# Patient Record
Sex: MALE | Race: WHITE | Employment: FULL TIME | URBAN - METROPOLITAN AREA
[De-identification: names, ages, dates, MRNs, and addresses within clinical notes are randomized per-mention and may not be internally consistent; named-entity substitution may affect disease eponyms.]

---

## 2024-02-08 ENCOUNTER — OFFICE VISIT (OUTPATIENT)
Dept: FAMILY MEDICINE CLINIC | Facility: CLINIC | Age: 59
End: 2024-02-08
Payer: COMMERCIAL

## 2024-02-08 VITALS
HEIGHT: 63 IN | BODY MASS INDEX: 26.93 KG/M2 | DIASTOLIC BLOOD PRESSURE: 90 MMHG | WEIGHT: 152 LBS | TEMPERATURE: 98 F | RESPIRATION RATE: 16 BRPM | SYSTOLIC BLOOD PRESSURE: 160 MMHG | HEART RATE: 69 BPM | OXYGEN SATURATION: 99 %

## 2024-02-08 DIAGNOSIS — I10 PRIMARY HYPERTENSION: ICD-10-CM

## 2024-02-08 DIAGNOSIS — Z11.59 NEED FOR HEPATITIS C SCREENING TEST: ICD-10-CM

## 2024-02-08 DIAGNOSIS — N40.0 BENIGN PROSTATIC HYPERPLASIA WITHOUT LOWER URINARY TRACT SYMPTOMS: ICD-10-CM

## 2024-02-08 DIAGNOSIS — K29.50 OTHER CHRONIC GASTRITIS WITHOUT HEMORRHAGE: ICD-10-CM

## 2024-02-08 DIAGNOSIS — K76.0 FATTY LIVER: ICD-10-CM

## 2024-02-08 DIAGNOSIS — Z00.00 ANNUAL PHYSICAL EXAM: Primary | ICD-10-CM

## 2024-02-08 DIAGNOSIS — K80.20 GALLSTONES: ICD-10-CM

## 2024-02-08 PROCEDURE — 99396 PREV VISIT EST AGE 40-64: CPT | Performed by: FAMILY MEDICINE

## 2024-02-08 PROCEDURE — 99204 OFFICE O/P NEW MOD 45 MIN: CPT | Performed by: FAMILY MEDICINE

## 2024-02-08 RX ORDER — VALSARTAN 80 MG/1
80 TABLET ORAL DAILY
Qty: 30 TABLET | Refills: 6 | Status: SHIPPED | OUTPATIENT
Start: 2024-02-08

## 2024-02-08 NOTE — PROGRESS NOTES
Name: Shakir Montiel      : 1965      MRN: [id number]  Encounter Provider: Neena Aly MD  Encounter Date: 2024   Encounter department: [facility] MEDICINE    Assessment & Plan     1. Annual physical exam  Assessment & Plan:  Check routine labs      Orders:  -     CBC and differential; Future  -     Comprehensive metabolic panel; Future; Expected date: 2024  -     Lipid panel; Future; Expected date: 2024  -     TSH, 3rd generation; Future  -     Urinalysis with microscopic  -     PSA, Total Screen; Future    2. Need for hepatitis C screening test  -     Hepatitis C Antibody; Future    3. Fatty liver  Assessment & Plan:  Discussion on diet and exercise guidelines for weight loss and  health reviewed with pt         4. Other chronic gastritis without hemorrhage  Assessment & Plan:  Had endoscopy  in [place] had H pylori needs  was treated       5. Gallstones  Assessment & Plan:  Asymptomatic       6. Primary hypertension  Assessment & Plan:  Start valsartan 80mg po qd and check cmp 1 week and visit 1 week     Orders:  -     valsartan (DIOVAN) 80 mg tablet; Take 1 tablet (80 mg total) by mouth daily    7. Benign prostatic hyperplasia without lower urinary tract symptoms  Assessment & Plan:  Check psa             Subjective      New patient here to be established due for annual physical      Review of Systems   Constitutional:  Negative for appetite change, chills, fatigue and fever.   Respiratory:  Negative for cough, chest tightness and shortness of breath.    Cardiovascular:  Negative for chest pain, palpitations and leg swelling.   Gastrointestinal:  Negative for abdominal pain, constipation, diarrhea, nausea and vomiting.   Genitourinary:  Negative for difficulty urinating and frequency.   Musculoskeletal:  Negative for arthralgias, back pain, gait problem and neck pain.   Skin:  Negative for rash.   Neurological:  Negative for dizziness, weakness,  "light-headedness, numbness and headaches.   Hematological:  Does not bruise/bleed easily.   Psychiatric/Behavioral:  Negative for dysphoric mood and sleep disturbance. The patient is not nervous/anxious.        No current outpatient medications on file prior to visit.       Objective     /90 (BP Location: Right arm)   Pulse 69   Temp 98 °F (36.7 °C) (Tympanic)   Resp 16   Ht 5' 3\" (1.6 m)   Wt 68.9 kg (152 lb)   SpO2 99%   BMI 26.93 kg/m²     Physical Exam  Vitals reviewed.   Constitutional:       General: He is not in acute distress.     Appearance: Normal appearance. He is not ill-appearing.   HENT:      Right Ear: Tympanic membrane, ear canal and external ear normal.      Left Ear: Tympanic membrane, ear canal and external ear normal.      Nose: Nose normal.   Eyes:      General: Lids are normal.      Extraocular Movements: Extraocular movements intact.      Conjunctiva/sclera: Conjunctivae normal.      Pupils: Pupils are equal, round, and reactive to light.   Neck:      Thyroid: No thyromegaly.      Vascular: No carotid bruit.   Cardiovascular:      Rate and Rhythm: Normal rate and regular rhythm.      Pulses: Normal pulses.      Heart sounds: Normal heart sounds.   Pulmonary:      Effort: Pulmonary effort is normal.      Breath sounds: Normal breath sounds.   Chest:   Breasts:     Right: Normal.      Left: Normal.   Abdominal:      General: Bowel sounds are normal. There is no distension.      Palpations: Abdomen is soft. There is no mass.      Tenderness: There is no abdominal tenderness.      Hernia: No hernia is present.   Musculoskeletal:         General: Normal range of motion.      Cervical back: Full passive range of motion without pain, normal range of motion and neck supple.      Right lower leg: No edema.      Left lower leg: No edema.   Lymphadenopathy:      Cervical: No cervical adenopathy.   Skin:     General: Skin is warm and dry.      Comments: No abn moles   Neurological:      " General: No focal deficit present.      Mental Status: He is alert and oriented to person, place, and time. Mental status is at baseline.      Cranial Nerves: No cranial nerve deficit.      Sensory: No sensory deficit.      Motor: No weakness or tremor.      Coordination: Coordination normal.      Gait: Gait normal.      Deep Tendon Reflexes: Reflexes normal.   Psychiatric:         Mood and Affect: Mood normal.         Speech: Speech normal.         Behavior: Behavior normal.       Neena Aly MD  ]

## 2024-02-15 ENCOUNTER — LAB (OUTPATIENT)
Dept: LAB | Facility: HOSPITAL | Age: 59
End: 2024-02-15
Payer: COMMERCIAL

## 2024-02-15 DIAGNOSIS — Z11.59 NEED FOR HEPATITIS C SCREENING TEST: ICD-10-CM

## 2024-02-15 DIAGNOSIS — Z00.00 ANNUAL PHYSICAL EXAM: ICD-10-CM

## 2024-02-15 LAB
ALBUMIN SERPL BCP-MCNC: 4.5 G/DL (ref 3.5–5)
ALP SERPL-CCNC: 76 U/L (ref 34–104)
ALT SERPL W P-5'-P-CCNC: 20 U/L (ref 7–52)
ANION GAP SERPL CALCULATED.3IONS-SCNC: 6 MMOL/L
AST SERPL W P-5'-P-CCNC: 23 U/L (ref 13–39)
BACTERIA UR QL AUTO: ABNORMAL /HPF
BASOPHILS # BLD AUTO: 0.01 THOUSANDS/ÂΜL (ref 0–0.1)
BASOPHILS NFR BLD AUTO: 0 % (ref 0–1)
BILIRUB SERPL-MCNC: 0.9 MG/DL (ref 0.2–1)
BILIRUB UR QL STRIP: NEGATIVE
BUN SERPL-MCNC: 14 MG/DL (ref 5–25)
CALCIUM SERPL-MCNC: 9.5 MG/DL (ref 8.4–10.2)
CHLORIDE SERPL-SCNC: 101 MMOL/L (ref 96–108)
CHOLEST SERPL-MCNC: 207 MG/DL
CLARITY UR: CLEAR
CO2 SERPL-SCNC: 29 MMOL/L (ref 21–32)
COLOR UR: YELLOW
CREAT SERPL-MCNC: 0.91 MG/DL (ref 0.6–1.3)
EOSINOPHIL # BLD AUTO: 0.1 THOUSAND/ÂΜL (ref 0–0.61)
EOSINOPHIL NFR BLD AUTO: 2 % (ref 0–6)
ERYTHROCYTE [DISTWIDTH] IN BLOOD BY AUTOMATED COUNT: 12.8 % (ref 11.6–15.1)
GFR SERPL CREATININE-BSD FRML MDRD: 91 ML/MIN/1.73SQ M
GLUCOSE P FAST SERPL-MCNC: 100 MG/DL (ref 65–99)
GLUCOSE UR STRIP-MCNC: NEGATIVE MG/DL
HCT VFR BLD AUTO: 50.5 % (ref 36.5–49.3)
HCV AB SER QL: NORMAL
HDLC SERPL-MCNC: 60 MG/DL
HGB BLD-MCNC: 17 G/DL (ref 12–17)
HGB UR QL STRIP.AUTO: ABNORMAL
IMM GRANULOCYTES # BLD AUTO: 0.01 THOUSAND/UL (ref 0–0.2)
IMM GRANULOCYTES NFR BLD AUTO: 0 % (ref 0–2)
KETONES UR STRIP-MCNC: NEGATIVE MG/DL
LDLC SERPL CALC-MCNC: 131 MG/DL (ref 0–100)
LEUKOCYTE ESTERASE UR QL STRIP: NEGATIVE
LYMPHOCYTES # BLD AUTO: 2.25 THOUSANDS/ÂΜL (ref 0.6–4.47)
LYMPHOCYTES NFR BLD AUTO: 36 % (ref 14–44)
MCH RBC QN AUTO: 30.9 PG (ref 26.8–34.3)
MCHC RBC AUTO-ENTMCNC: 33.7 G/DL (ref 31.4–37.4)
MCV RBC AUTO: 92 FL (ref 82–98)
MONOCYTES # BLD AUTO: 0.52 THOUSAND/ÂΜL (ref 0.17–1.22)
MONOCYTES NFR BLD AUTO: 8 % (ref 4–12)
NEUTROPHILS # BLD AUTO: 3.32 THOUSANDS/ÂΜL (ref 1.85–7.62)
NEUTS SEG NFR BLD AUTO: 54 % (ref 43–75)
NITRITE UR QL STRIP: NEGATIVE
NON-SQ EPI CELLS URNS QL MICRO: ABNORMAL /HPF
NONHDLC SERPL-MCNC: 147 MG/DL
NRBC BLD AUTO-RTO: 0 /100 WBCS
PH UR STRIP.AUTO: 6 [PH]
PLATELET # BLD AUTO: 271 THOUSANDS/UL (ref 149–390)
PMV BLD AUTO: 10.5 FL (ref 8.9–12.7)
POTASSIUM SERPL-SCNC: 3.8 MMOL/L (ref 3.5–5.3)
PROT SERPL-MCNC: 7.9 G/DL (ref 6.4–8.4)
PROT UR STRIP-MCNC: NEGATIVE MG/DL
PSA SERPL-MCNC: 1.03 NG/ML (ref 0–4)
RBC # BLD AUTO: 5.51 MILLION/UL (ref 3.88–5.62)
RBC #/AREA URNS AUTO: ABNORMAL /HPF
SODIUM SERPL-SCNC: 136 MMOL/L (ref 135–147)
SP GR UR STRIP.AUTO: 1.01 (ref 1–1.03)
TRIGL SERPL-MCNC: 82 MG/DL
TSH SERPL DL<=0.05 MIU/L-ACNC: 3.07 UIU/ML (ref 0.45–4.5)
UROBILINOGEN UR QL STRIP.AUTO: 0.2 E.U./DL
WBC # BLD AUTO: 6.21 THOUSAND/UL (ref 4.31–10.16)
WBC #/AREA URNS AUTO: ABNORMAL /HPF

## 2024-02-15 PROCEDURE — G0103 PSA SCREENING: HCPCS

## 2024-02-15 PROCEDURE — 84443 ASSAY THYROID STIM HORMONE: CPT

## 2024-02-15 PROCEDURE — 80053 COMPREHEN METABOLIC PANEL: CPT

## 2024-02-15 PROCEDURE — 86803 HEPATITIS C AB TEST: CPT

## 2024-02-15 PROCEDURE — 81001 URINALYSIS AUTO W/SCOPE: CPT | Performed by: FAMILY MEDICINE

## 2024-02-15 PROCEDURE — 36415 COLL VENOUS BLD VENIPUNCTURE: CPT

## 2024-02-15 PROCEDURE — 85025 COMPLETE CBC W/AUTO DIFF WBC: CPT

## 2024-02-15 PROCEDURE — 80061 LIPID PANEL: CPT

## 2024-02-16 ENCOUNTER — OFFICE VISIT (OUTPATIENT)
Dept: FAMILY MEDICINE CLINIC | Facility: CLINIC | Age: 59
End: 2024-02-16
Payer: COMMERCIAL

## 2024-02-16 VITALS
SYSTOLIC BLOOD PRESSURE: 122 MMHG | DIASTOLIC BLOOD PRESSURE: 78 MMHG | RESPIRATION RATE: 16 BRPM | OXYGEN SATURATION: 99 % | HEART RATE: 71 BPM | TEMPERATURE: 98 F | BODY MASS INDEX: 27.46 KG/M2 | HEIGHT: 63 IN | WEIGHT: 155 LBS

## 2024-02-16 DIAGNOSIS — I10 PRIMARY HYPERTENSION: Primary | ICD-10-CM

## 2024-02-16 DIAGNOSIS — M65.331 TRIGGER MIDDLE FINGER OF RIGHT HAND: ICD-10-CM

## 2024-02-16 DIAGNOSIS — K29.50 OTHER CHRONIC GASTRITIS WITHOUT HEMORRHAGE: ICD-10-CM

## 2024-02-16 DIAGNOSIS — R73.09 ABNORMAL GLUCOSE LEVEL: ICD-10-CM

## 2024-02-16 DIAGNOSIS — E78.5 DYSLIPIDEMIA: ICD-10-CM

## 2024-02-16 PROBLEM — R73.03 PREDIABETES: Status: ACTIVE | Noted: 2024-02-16

## 2024-02-16 LAB — SL AMB POCT HEMOGLOBIN AIC: 5.7 (ref ?–6.5)

## 2024-02-16 PROCEDURE — 83036 HEMOGLOBIN GLYCOSYLATED A1C: CPT | Performed by: FAMILY MEDICINE

## 2024-02-16 PROCEDURE — 99214 OFFICE O/P EST MOD 30 MIN: CPT | Performed by: FAMILY MEDICINE

## 2024-02-16 NOTE — ASSESSMENT & PLAN NOTE
Check lipids 6 mo Discussion on diet and exercise guidelines for weight loss and  health reviewed with pt

## 2024-02-16 NOTE — PROGRESS NOTES
"Name: Shakir Montiel      : 1965      MRN: [id number]  Encounter Provider: Neena Aly MD  Encounter Date: 2024   Encounter department: [facility] MEDICINE    Assessment & Plan     1. Primary hypertension  Assessment & Plan:  Much better on valsartan  follow  up 6 mo      2. Abnormal glucose level  Assessment & Plan:  Check HGa1c    Orders:  -     POCT hemoglobin A1c    3. Dyslipidemia  Assessment & Plan:  Check lipids 6 mo Discussion on diet and exercise guidelines for weight loss and  health reviewed with pt       Orders:  -     Lipid panel; Future; Expected date: 2024    4. Other chronic gastritis without hemorrhage  Assessment & Plan:  To  GI   for  eval     Orders:  -     Ambulatory Referral to Gastroenterology; Future    5. Trigger middle finger of right hand  Assessment & Plan:  To hand surgeon    Orders:  -     Ambulatory Referral to Orthopedic Surgery; Future           Subjective      Here for reevaluation of blood pressure on valsartan  and check Hga1c for elevated glucose  also elevated lipids on labs       Review of Systems   Respiratory:  Negative for cough, chest tightness and shortness of breath.    Cardiovascular:  Negative for chest pain, palpitations and leg swelling.   Gastrointestinal:         Reflux symptoms   Musculoskeletal:         Trigger finger right 3rd    Neurological:  Negative for dizziness, weakness, light-headedness and headaches.   Psychiatric/Behavioral:  Negative for dysphoric mood. The patient is not nervous/anxious.        Current Outpatient Medications on File Prior to Visit   Medication Sig   • valsartan (DIOVAN) 80 mg tablet Take 1 tablet (80 mg total) by mouth daily       Objective     /78 (BP Location: Left arm, Patient Position: Sitting, Cuff Size: Standard)   Pulse 71   Temp 98 °F (36.7 °C) (Tympanic)   Resp 16   Ht 5' 3\" (1.6 m)   Wt 70.3 kg (155 lb)   SpO2 99%   BMI 27.46 kg/m²     Physical " Exam  Constitutional:       General: He is not in acute distress.     Appearance: Normal appearance. He is well-developed. He is not ill-appearing.   Eyes:      Extraocular Movements: Extraocular movements intact.      Pupils: Pupils are equal, round, and reactive to light.   Neck:      Thyroid: No thyromegaly.   Cardiovascular:      Rate and Rhythm: Normal rate and regular rhythm.      Pulses: Normal pulses.      Heart sounds: Normal heart sounds. No murmur heard.  Pulmonary:      Effort: Pulmonary effort is normal. No respiratory distress.      Breath sounds: Normal breath sounds.   Musculoskeletal:      Cervical back: Normal range of motion.      Comments: 3rd  finger right    Neurological:      General: No focal deficit present.      Mental Status: He is alert and oriented to person, place, and time. Mental status is at baseline.   Psychiatric:         Mood and Affect: Mood normal.         Behavior: Behavior normal.       Neena Aly MD

## 2024-02-16 NOTE — ASSESSMENT & PLAN NOTE
Hga1c 5.7 Discussion on diet and exercise guidelines for weight loss and  health reviewed with pt

## 2024-02-22 ENCOUNTER — TELEPHONE (OUTPATIENT)
Dept: OTHER | Facility: OTHER | Age: 59
End: 2024-02-22

## 2024-03-07 ENCOUNTER — OFFICE VISIT (OUTPATIENT)
Dept: OBGYN CLINIC | Facility: CLINIC | Age: 59
End: 2024-03-07

## 2024-03-07 ENCOUNTER — HOSPITAL ENCOUNTER (OUTPATIENT)
Dept: RADIOLOGY | Facility: HOSPITAL | Age: 59
End: 2024-03-07
Attending: STUDENT IN AN ORGANIZED HEALTH CARE EDUCATION/TRAINING PROGRAM

## 2024-03-07 VITALS — WEIGHT: 155 LBS | HEIGHT: 63 IN | BODY MASS INDEX: 27.46 KG/M2

## 2024-03-07 DIAGNOSIS — M65.331 TRIGGER MIDDLE FINGER OF RIGHT HAND: ICD-10-CM

## 2024-03-07 DIAGNOSIS — L60.9 NAIL LESION: ICD-10-CM

## 2024-03-07 DIAGNOSIS — L60.9 NAIL LESION: Primary | ICD-10-CM

## 2024-03-07 PROCEDURE — 20550 NJX 1 TENDON SHEATH/LIGAMENT: CPT | Performed by: STUDENT IN AN ORGANIZED HEALTH CARE EDUCATION/TRAINING PROGRAM

## 2024-03-07 PROCEDURE — 73140 X-RAY EXAM OF FINGER(S): CPT

## 2024-03-07 PROCEDURE — 99244 OFF/OP CNSLTJ NEW/EST MOD 40: CPT | Performed by: STUDENT IN AN ORGANIZED HEALTH CARE EDUCATION/TRAINING PROGRAM

## 2024-03-07 RX ORDER — BETAMETHASONE SODIUM PHOSPHATE AND BETAMETHASONE ACETATE 3; 3 MG/ML; MG/ML
6 INJECTION, SUSPENSION INTRA-ARTICULAR; INTRALESIONAL; INTRAMUSCULAR; SOFT TISSUE
Status: COMPLETED | OUTPATIENT
Start: 2024-03-07 | End: 2024-03-07

## 2024-03-07 RX ORDER — CHLORHEXIDINE GLUCONATE ORAL RINSE 1.2 MG/ML
15 SOLUTION DENTAL ONCE
OUTPATIENT
Start: 2024-03-07 | End: 2024-03-07

## 2024-03-07 RX ORDER — LIDOCAINE HYDROCHLORIDE 5 MG/ML
1 INJECTION, SOLUTION INFILTRATION; PERINEURAL
Status: COMPLETED | OUTPATIENT
Start: 2024-03-07 | End: 2024-03-07

## 2024-03-07 RX ADMIN — LIDOCAINE HYDROCHLORIDE 1 ML: 5 INJECTION, SOLUTION INFILTRATION; PERINEURAL at 09:00

## 2024-03-07 RX ADMIN — BETAMETHASONE SODIUM PHOSPHATE AND BETAMETHASONE ACETATE 6 MG: 3; 3 INJECTION, SUSPENSION INTRA-ARTICULAR; INTRALESIONAL; INTRAMUSCULAR; SOFT TISSUE at 09:00

## 2024-03-07 NOTE — LETTER
March 8, 2024     Neena Aly MD  [address]  [address]  [place] [address]    Patient: Shakir Montiel   YOB: 1965   Date of Visit: 3/7/2024       Dear Dr. Aly:    Thank you for referring Shakir Montiel to me for evaluation. Below are my notes for this consultation.    If you have questions, please do not hesitate to call me. I look forward to following your patient along with you.         Sincerely,        Ulices Crespo MD        CC: No Recipients

## 2024-03-07 NOTE — PROGRESS NOTES
ORTHOPAEDIC HAND, WRIST, AND ELBOW OFFICE  VISIT      ASSESSMENT/PLAN:      Diagnoses and all orders for this visit:    Nail lesion  -     XR finger left second digit-index; Future  -     Case request operating room: EXCISION FINGER MASS - LEFT INDEX FINGER  POSSIBLE SOFT TISSUE COVERAGE; Standing  -     Ambulatory referral to Family Practice; Future  -     Case request operating room: EXCISION FINGER MASS - LEFT INDEX FINGER  POSSIBLE SOFT TISSUE COVERAGE    Trigger middle finger of right hand  -     Ambulatory Referral to Orthopedic Surgery  -     Hand/upper extremity injection: R long A1  -     lidocaine (XYLOCAINE) 0.5 % injection 1 mL  -     betamethasone acetate-betamethasone sodium phosphate (CELESTONE) injection 6 mg    Other orders  -     Diet NPO; Sips with meds; Standing  -     Nursing Communication Warmimg Interventions Implemented; Standing  -     Nursing Communication CHG bath, have staff wash entire body (neck down) per pre-op bathing protocol. Routine, evening prior to, and day of surgery.; Standing  -     Nursing Communication Swab both nares with Povidone-Iodine solution, EXCLUDE if patient has shellfish/Iodine allergy, and replace with nasal alcohol swabstick. Routine, day of surgery, on call to OR; Standing  -     chlorhexidine (PERIDEX) 0.12 % oral rinse 15 mL  -     Void on call to OR; Standing  -     Insert peripheral IV; Standing  -     ceFAZolin (ANCEF) 2,000 mg in dextrose 5 % 100 mL IVPB          58 y.o. male with right long TF and left index finger radial aspect nail lesion that was previous excised in 2/2023    Discussed with the patient that his signs and symptoms are consistent with right long TF. Treatment options for the trigger finger was discussed in the form of a steroid injection. The patient was agreeable to this. He consented and underwent a right long finger trigger finger injection in the office today without any complications. The patient also has a nail lesion radial aspect  of his left index finger. He did have this excised in [place] in February of 2023. The pathology was reviewed in the office today which was consistent with fibroma. A clinical images was taken and uploaded into the patient's chart. A photo of the pathology report was also taken and uploaded into the patient's chart under the media tab. Treatment options were discussed. The patient elected to proceed with left index finger excision of lesion with possible soft tissue coverage. Discussed risk of recurrence as this did come back once. Risks of the surgery are inclusive of but not limited to bleeding, infection, nerve injury, blood clot, worsening of symptoms, not achieving the anticipated results, persistent stiffness, weakness and the need for additional surgery. The patient verbally stated they understood those risks and would like to proceed with the surgery. Surgical consent was signed in the office today. I will see him the day of surgery.        Follow Up:  After surgery       To Do Next Visit:  Sutures out      Discussions:  Trigger Finger: The anatomy and physiology of trigger finger was discussed with the patient today in the office.  Edema and increased contact pressure within the flexor tendons at the A1 pulley can cause pain, crepitation, and triggering or locking of the digit resulting in limitation of function.  Symptoms can occur at anytime but are typically worse in the morning or after a brief rest from repetitive activity.  Treatment options include resting/nighttime MP blocking splints to decrease edema, oral anti-inflammatory medications, home or formal therapy exercises, up to 2 steroid injections within the tendon sheath, or surgical release.  While majority of patients do respond to conservative treatment, up to 20% may require surgical release.       Ulices Crespo MD  Attending, Orthopaedic Surgery  Hand, Wrist, and Elbow Surgery  [facility]    ______________________________________________________________________________________________    CHIEF COMPLAINT:  Chief Complaint   Patient presents with   • Right Middle Finger - Pain       SUBJECTIVE:  Patient is a 58 y.o. ambidextrous male who presents today for evaluation and treatment of right long finger pain. The patient notes a history of a puncture wound to right palm.  On his left index finger radial aspect he notes a nail lesion that was previous excised in [place] in February of 2023. He states he has this for 12 years and it has came back after the excision. The  previous pathology was consistent with fibroma. The patient is referred by Neena Aly MD. Feb 2023     Occupation: puts away projects      I have personally reviewed all the relevant PMH, PSH, SH, FH, Medications and allergies      PAST MEDICAL HISTORY:  Past Medical History:   Diagnosis Date   • Fatty liver    • Gastritis        PAST SURGICAL HISTORY:  Past Surgical History:   Procedure Laterality Date   • APPENDECTOMY         FAMILY HISTORY:  Family History   Problem Relation Age of Onset   • Pulmonary fibrosis Mother    • Pancreatic cancer Father        SOCIAL HISTORY:  Social History     Tobacco Use   • Smoking status: Never     Passive exposure: Never   • Smokeless tobacco: Never   Vaping Use   • Vaping status: Never Used   Substance Use Topics   • Alcohol use: Yes     Comment: socially       MEDICATIONS:    Current Outpatient Medications:   •  valsartan (DIOVAN) 80 mg tablet, Take 1 tablet (80 mg total) by mouth daily, Disp: 30 tablet, Rfl: 6  No current facility-administered medications for this visit.    ALLERGIES:  No Known Allergies        REVIEW OF SYSTEMS:  Musculoskeletal:        As noted in HPI.   All other systems reviewed and are negative.    VITALS:  There were no vitals filed for this visit.    LABS:  HgA1c:   Lab Results   Component Value Date    HGBA1C 5.7 02/16/2024     BMP:   Lab Results   Component Value  "Date    CALCIUM 9.5 02/15/2024    K 3.8 02/15/2024    CO2 29 02/15/2024     02/15/2024    BUN 14 02/15/2024    CREATININE 0.91 02/15/2024       _____________________________________________________  PHYSICAL EXAMINATION:  General: Well developed and well nourished, alert & oriented x 3, appears comfortable  Psychiatric: Normal  HEENT: Normocephalic, Atraumatic Trachea Midline, No torticollis  Pulmonary: No audible wheezing or respiratory distress   Abdomen/GI: Non tender, non distended   Cardiovascular: No pitting edema, 2+ radial pulse   Skin: No masses, erythema, lacerations, fluctation, ulcerations  Neurovascular: Sensation Intact to the Median, Ulnar, Radial Nerve, Motor Intact to the Median, Ulnar, Radial Nerve, and Pulses Intact  Musculoskeletal: Normal, except as noted in detailed exam and in HPI.      MUSCULOSKELETAL EXAMINATION:  Right long finger   + click  TTP A1 pulley     Left index finger  Radial aspect nail lesion  6 mm wide by 12 mm long by 2 mm high hypertrophic tissue over the radial aspect of the nail  Composite fist  Full digit extension  ___________________________________________________  STUDIES REVIEWED:  Xrays of the left index finger were reviewed and independently interpreted in PACS by Dr. Crespo and demonstrate no osseous abnormalities.          PROCEDURES PERFORMED:  Hand/upper extremity injection: R long A1  Universal Protocol:  Consent: Verbal consent obtained.  Risks and benefits: risks, benefits and alternatives were discussed  Consent given by: patient  Time out: Immediately prior to procedure a \"time out\" was called to verify the correct patient, procedure, equipment, support staff and site/side marked as required.  Patient understanding: patient states understanding of the procedure being performed  Site marked: the operative site was marked  Patient identity confirmed: verbally with patient  Supporting Documentation  Indications: tendon swelling   Procedure " Details  Condition:trigger finger Location: long finger - R long A1   Preparation: Patient was prepped and draped in the usual sterile fashion  Needle size: 25 G  Approach: volar  Medications administered: 6 mg betamethasone acetate-betamethasone sodium phosphate 6 (3-3) mg/mL; 1 mL lidocaine 0.5 %  Patient tolerance: patient tolerated the procedure well with no immediate complications  Dressing:  Sterile dressing applied              _____________________________________________________      Scribe Attestation    I,:  Cassi Gonzalez MA am acting as a scribe while in the presence of the attending physician.:       I,:  Ulices Crespo MD personally performed the services described in this documentation    as scribed in my presence.:

## 2024-04-17 ENCOUNTER — ANESTHESIA EVENT (OUTPATIENT)
Dept: PERIOP | Facility: AMBULARY SURGERY CENTER | Age: 59
End: 2024-04-17
Payer: COMMERCIAL

## 2024-04-18 ENCOUNTER — TELEPHONE (OUTPATIENT)
Age: 59
End: 2024-04-18

## 2024-04-18 NOTE — TELEPHONE ENCOUNTER
Caller: Gilberto - son    Doctor: Zak    Reason for call: Patients son is calling on behalf of his father because his father speaks only Mongolian. He is calling to reschedule surgery. He would like to move it to August.    Call back#: [phone]

## 2024-05-01 ENCOUNTER — ANESTHESIA (OUTPATIENT)
Dept: PERIOP | Facility: AMBULARY SURGERY CENTER | Age: 59
End: 2024-05-01
Payer: COMMERCIAL

## 2024-05-07 ENCOUNTER — TELEPHONE (OUTPATIENT)
Dept: GASTROENTEROLOGY | Facility: CLINIC | Age: 59
End: 2024-05-07

## 2024-05-07 ENCOUNTER — CONSULT (OUTPATIENT)
Dept: GASTROENTEROLOGY | Facility: CLINIC | Age: 59
End: 2024-05-07
Payer: COMMERCIAL

## 2024-05-07 VITALS
DIASTOLIC BLOOD PRESSURE: 80 MMHG | HEIGHT: 63 IN | HEART RATE: 71 BPM | SYSTOLIC BLOOD PRESSURE: 147 MMHG | WEIGHT: 153 LBS | BODY MASS INDEX: 27.11 KG/M2

## 2024-05-07 DIAGNOSIS — R10.13 EPIGASTRIC PAIN: Primary | ICD-10-CM

## 2024-05-07 DIAGNOSIS — R14.0 BLOATING: ICD-10-CM

## 2024-05-07 DIAGNOSIS — K29.50 OTHER CHRONIC GASTRITIS WITHOUT HEMORRHAGE: ICD-10-CM

## 2024-05-07 PROCEDURE — 99204 OFFICE O/P NEW MOD 45 MIN: CPT | Performed by: INTERNAL MEDICINE

## 2024-05-07 NOTE — TELEPHONE ENCOUNTER
Scheduled date of EGD(as of today):7/23/24  Physician performing EGD:Dr Parham   Location of EGD [facility]   Instructions reviewed with patient by:sb  Clearances: none

## 2024-05-07 NOTE — PROGRESS NOTES
[facility] [place] - Outpatient Consultation  Shakir Montiel 59 y.o. male MRN: [id number]  Encounter: [id number]          ASSESSMENT AND PLAN:      1. Epigastric pain  -     EGD; Future; Expected date: 05/07/2024    2. Bloating  -     EGD; Future; Expected date: 05/07/2024    3. Other chronic gastritis without hemorrhage  -     Ambulatory Referral to Gastroenterology  -     EGD; Future; Expected date: 05/07/2024      Dyspepsia upper abdominal discomfort fullness bloating, clinically no evidence of acute abdomen is obstruction, he believes he has gastritis he was told of the same.  Denies any fever chills rash, and clinically no tenderness.  Antireflux measures reviewed diet discussed, schedule EGD for further evaluation.  In the meanwhile avoid acidy fried greasy foods.  For H. pylori at the same time.  He will try to obtain translation of his prior GI evaluation notes.  Mention of gallstones in the problem list but I could not find any other sources to substantiate that.  ______________________________________________________________________    HPI:      Patient came in to discuss his upper abdominal discomfort fullness bloating, he tells me has been test checked in his country [place] and was told he has gastritis.  Apparently he also had a colonoscopy done about a year ago there and was told was normal though we do not have those results available since they were in [place].  Patient does not speak much English, information obtained through the .  Denies any blood in stools melena hematochezia.  Is fair weight stable denies dysphagia coughing choking spells denies any weight loss chest pain shortness of breath fever chills rash, diet medications more than 10 systems reviewed.      REVIEW OF SYSTEMS:    CONSTITUTIONAL: Denies any fever, chills, rigors, and weight loss.  HEENT: No earache or tinnitus.  CARDIOVASCULAR: No chest pain or palpitations.   RESPIRATORY: Denies any cough,  "hemoptysis, shortness of breath or dyspnea on exertion.  GASTROINTESTINAL: As noted in the History of Present Illness.   GENITOURINARY: Denies any hematuria or dysuria.  NEUROLOGIC: No dizziness or vertigo.   MUSCULOSKELETAL: Denies any joint swellings.  SKIN: Denies skin rashes or itching.   ENDOCRINE: Denies excessive thirst. Denies intolerance to heat or cold.  PSYCHOSOCIAL: Denies depression or anxiety. Denies any recent memory loss.       Historical Information   Past Medical History:   Diagnosis Date   • Fatty liver    • Gastritis    • Hypertension      Past Surgical History:   Procedure Laterality Date   • APPENDECTOMY       Social History   Social History     Substance and Sexual Activity   Alcohol Use Yes    Comment: socially     Social History     Substance and Sexual Activity   Drug Use Not on file     Social History     Tobacco Use   Smoking Status Never   • Passive exposure: Never   Smokeless Tobacco Never     Family History   Problem Relation Age of Onset   • Pulmonary fibrosis Mother    • Pancreatic cancer Father        Meds/Allergies       Current Outpatient Medications:   •  Calcium & Magnesium Carbonates (MYLANTA PO)  •  valsartan (DIOVAN) 80 mg tablet    No Known Allergies        Objective     Blood pressure 147/80, pulse 71, height 5' 3\" (1.6 m), weight 69.4 kg (153 lb). Body mass index is 27.1 kg/m².        PHYSICAL EXAM:      General Appearance:   Alert, cooperative, no distress   HEENT:   Normocephalic, atraumatic, anicteric.     Neck:  Supple, symmetrical, trachea midline   Lungs:   Clear to auscultation bilaterally; no rales, rhonchi or wheezing; respirations unlabored    Heart::   Regular rate and rhythm; no murmur.   Abdomen:   Soft, non-tender, non-distended; normal bowel sounds; no masses, no organomegaly    Genitalia:   Deferred    Rectal:   Deferred    Extremities:  No cyanosis, clubbing or edema    Skin:  No jaundice, rashes, or lesions    Lymph nodes:  No palpable cervical " lymphadenopathy        Lab Results:   No visits with results within 1 Day(s) from this visit.   Latest known visit with results is:   Office Visit on 02/16/2024   Component Date Value   • Hemoglobin A1C 02/16/2024 5.7          Radiology Results:   No results found.

## 2024-08-07 DIAGNOSIS — Z91.89 AT RISK FOR SLEEP APNEA: Primary | ICD-10-CM

## 2024-08-07 NOTE — PRE-PROCEDURE INSTRUCTIONS
No outpatient medications have been marked as taking for the 8/14/24 encounter (Hospital Encounter).   Medication instructions for day surgery reviewed. Please use only a sip of water to take your instructed medications. Avoid all over the counter vitamins, supplements and NSAIDS for one week prior to surgery per anesthesia guidelines. Tylenol is ok to take as needed.     You will receive a call one business day prior to surgery with an arrival time and hospital directions. If your surgery is scheduled on a Monday, the hospital will be calling you on the Friday prior to your surgery. If you have not heard from anyone by 8pm, please call the hospital supervisor through the hospital  at [phone]. ([place] [phone] or [place] [phone]).    Do not eat or drink anything after midnight the night before your surgery, including candy, mints, lifesavers, or chewing gum. Do not drink alcohol 24hrs before your surgery. Try not to smoke at least 24hrs before your surgery.       Follow the pre surgery showering instructions as listed in the “My Surgical Experience Booklet” or otherwise provided by your surgeon's office. Do not use a blade to shave the surgical area 1 week before surgery. It is okay to use a clean electric clippers up to 24 hours before surgery. Do not apply any lotions, creams, including makeup, cologne, deodorant, or perfumes after showering on the day of your surgery. Do not use dry shampoo, hair spray, hair gel, or any type of hair products.     No contact lenses, eye make-up, or artificial eyelashes. Remove nail polish, including gel polish, and any artificial, gel, or acrylic nails if possible. Remove all jewelry including rings and body piercing jewelry.     Wear causal clothing that is easy to take on and off. Consider your type of surgery.    Keep any valuables, jewelry, piercings at home. Please bring any specially ordered equipment (sling, braces) if indicated.    Arrange for a  responsible person to drive you to and from the hospital on the day of your surgery. Please confirm the visitor policy for the day of your procedure when you receive your phone call with an arrival time.     Call the surgeon's office with any new illnesses, exposures, or additional questions prior to surgery.    Please reference your “My Surgical Experience Booklet” for additional information to prepare for your upcoming surgery.    Advise AMB Referral to Sleep Medicine interested.

## 2024-08-13 NOTE — ANESTHESIA PREPROCEDURE EVALUATION
Procedure:  EXCISION FINGER NAILBED MASS - LEFT INDEX FINGER POSSIBLE SOFT TISSUE COVERAGE-skin graft possible advancement flap (Left: Hand)    Relevant Problems   CARDIO   (+) Primary hypertension      GI/HEPATIC   (+) Fatty liver      /RENAL   (+) Benign prostatic hyperplasia without lower urinary tract symptoms      Other   (+) Dyslipidemia        Physical Exam    Airway    Mallampati score: II  TM Distance: >3 FB  Neck ROM: full     Dental   No notable dental hx     Cardiovascular  Cardiovascular exam normal    Pulmonary  Pulmonary exam normal     Other Findings        Anesthesia Plan  ASA Score- 2     Anesthesia Type- IV sedation with anesthesia with ASA Monitors.         Additional Monitors:     Airway Plan:            Plan Factors-Exercise tolerance (METS): >4 METS.    Chart reviewed. EKG reviewed.  Existing labs reviewed. Patient summary reviewed.    Patient is not a current smoker. Patient not instructed to abstain from smoking on day of procedure. Patient did not smoke on day of surgery.            Induction-     Postoperative Plan-     Perioperative Resuscitation Plan - Level 1 - Full Code.       Informed Consent- Anesthetic plan and risks discussed with patient, spouse and daughter.  I personally reviewed this patient with the CRNA. Discussed and agreed on the Anesthesia Plan with the CRNA..        Lab Results   Component Value Date    HGBA1C 5.7 02/16/2024       Lab Results   Component Value Date    K 3.8 02/15/2024     02/15/2024    CO2 29 02/15/2024    BUN 14 02/15/2024    CREATININE 0.91 02/15/2024    GLUF 100 (H) 02/15/2024    CALCIUM 9.5 02/15/2024    AST 23 02/15/2024    ALT 20 02/15/2024    ALKPHOS 76 02/15/2024    EGFR 91 02/15/2024       Lab Results   Component Value Date    WBC 6.21 02/15/2024    HGB 17.0 02/15/2024    HCT 50.5 (H) 02/15/2024    MCV 92 02/15/2024     02/15/2024

## 2024-08-14 ENCOUNTER — HOSPITAL ENCOUNTER (OUTPATIENT)
Facility: AMBULARY SURGERY CENTER | Age: 59
Setting detail: OUTPATIENT SURGERY
Discharge: HOME | End: 2024-08-14
Attending: STUDENT IN AN ORGANIZED HEALTH CARE EDUCATION/TRAINING PROGRAM | Admitting: STUDENT IN AN ORGANIZED HEALTH CARE EDUCATION/TRAINING PROGRAM
Payer: COMMERCIAL

## 2024-08-14 VITALS
SYSTOLIC BLOOD PRESSURE: 150 MMHG | HEART RATE: 56 BPM | OXYGEN SATURATION: 96 % | BODY MASS INDEX: 24.07 KG/M2 | HEIGHT: 64 IN | WEIGHT: 141 LBS | DIASTOLIC BLOOD PRESSURE: 74 MMHG | RESPIRATION RATE: 16 BRPM | TEMPERATURE: 97.6 F

## 2024-08-14 DIAGNOSIS — L60.9 NAIL LESION: ICD-10-CM

## 2024-08-14 PROCEDURE — NC001 PR NO CHARGE: Performed by: STUDENT IN AN ORGANIZED HEALTH CARE EDUCATION/TRAINING PROGRAM

## 2024-08-14 PROCEDURE — 11750 EXCISION NAIL&NAIL MATRIX: CPT | Performed by: STUDENT IN AN ORGANIZED HEALTH CARE EDUCATION/TRAINING PROGRAM

## 2024-08-14 PROCEDURE — 11750 EXCISION NAIL&NAIL MATRIX: CPT | Performed by: PHYSICIAN ASSISTANT

## 2024-08-14 PROCEDURE — 88305 TISSUE EXAM BY PATHOLOGIST: CPT | Performed by: PATHOLOGY

## 2024-08-14 PROCEDURE — 88313 SPECIAL STAINS GROUP 2: CPT | Performed by: PATHOLOGY

## 2024-08-14 RX ORDER — LIDOCAINE HYDROCHLORIDE 20 MG/ML
INJECTION, SOLUTION EPIDURAL; INFILTRATION; INTRACAUDAL; PERINEURAL AS NEEDED
Status: DISCONTINUED | OUTPATIENT
Start: 2024-08-14 | End: 2024-08-14

## 2024-08-14 RX ORDER — CHLORHEXIDINE GLUCONATE ORAL RINSE 1.2 MG/ML
15 SOLUTION DENTAL ONCE
Status: DISCONTINUED | OUTPATIENT
Start: 2024-08-14 | End: 2024-08-14 | Stop reason: HOSPADM

## 2024-08-14 RX ORDER — HYDROCODONE BITARTRATE AND ACETAMINOPHEN 5; 325 MG/1; MG/1
1 TABLET ORAL ONCE AS NEEDED
Status: DISCONTINUED | OUTPATIENT
Start: 2024-08-14 | End: 2024-08-14 | Stop reason: HOSPADM

## 2024-08-14 RX ORDER — SODIUM CHLORIDE, SODIUM LACTATE, POTASSIUM CHLORIDE, CALCIUM CHLORIDE 600; 310; 30; 20 MG/100ML; MG/100ML; MG/100ML; MG/100ML
50 INJECTION, SOLUTION INTRAVENOUS CONTINUOUS
Status: DISCONTINUED | OUTPATIENT
Start: 2024-08-14 | End: 2024-08-14 | Stop reason: HOSPADM

## 2024-08-14 RX ORDER — MIDAZOLAM HYDROCHLORIDE 2 MG/2ML
INJECTION, SOLUTION INTRAMUSCULAR; INTRAVENOUS AS NEEDED
Status: DISCONTINUED | OUTPATIENT
Start: 2024-08-14 | End: 2024-08-14

## 2024-08-14 RX ORDER — FENTANYL CITRATE/PF 50 MCG/ML
25 SYRINGE (ML) INJECTION
Status: DISCONTINUED | OUTPATIENT
Start: 2024-08-14 | End: 2024-08-14 | Stop reason: HOSPADM

## 2024-08-14 RX ORDER — MAGNESIUM HYDROXIDE 1200 MG/15ML
LIQUID ORAL AS NEEDED
Status: DISCONTINUED | OUTPATIENT
Start: 2024-08-14 | End: 2024-08-14 | Stop reason: HOSPADM

## 2024-08-14 RX ORDER — PROPOFOL 10 MG/ML
INJECTION, EMULSION INTRAVENOUS AS NEEDED
Status: DISCONTINUED | OUTPATIENT
Start: 2024-08-14 | End: 2024-08-14

## 2024-08-14 RX ORDER — ONDANSETRON 2 MG/ML
4 INJECTION INTRAMUSCULAR; INTRAVENOUS ONCE AS NEEDED
Status: DISCONTINUED | OUTPATIENT
Start: 2024-08-14 | End: 2024-08-14 | Stop reason: HOSPADM

## 2024-08-14 RX ORDER — FENTANYL CITRATE 50 UG/ML
INJECTION, SOLUTION INTRAMUSCULAR; INTRAVENOUS AS NEEDED
Status: DISCONTINUED | OUTPATIENT
Start: 2024-08-14 | End: 2024-08-14

## 2024-08-14 RX ORDER — CEFAZOLIN SODIUM 2 G/50ML
2000 SOLUTION INTRAVENOUS ONCE
Status: COMPLETED | OUTPATIENT
Start: 2024-08-14 | End: 2024-08-14

## 2024-08-14 RX ORDER — HYDROCODONE BITARTRATE AND ACETAMINOPHEN 5; 325 MG/1; MG/1
1 TABLET ORAL EVERY 6 HOURS PRN
Qty: 5 TABLET | Refills: 0 | Status: SHIPPED | OUTPATIENT
Start: 2024-08-14 | End: 2024-08-22

## 2024-08-14 RX ADMIN — CEFAZOLIN SODIUM 2000 MG: 2 SOLUTION INTRAVENOUS at 14:45

## 2024-08-14 RX ADMIN — FENTANYL CITRATE 50 MCG: 50 INJECTION INTRAMUSCULAR; INTRAVENOUS at 14:44

## 2024-08-14 RX ADMIN — LIDOCAINE HYDROCHLORIDE 100 MG: 20 INJECTION, SOLUTION EPIDURAL; INFILTRATION; INTRACAUDAL at 14:49

## 2024-08-14 RX ADMIN — PROPOFOL 30 MCG/KG/MIN: 10 INJECTION, EMULSION INTRAVENOUS at 14:52

## 2024-08-14 RX ADMIN — MIDAZOLAM HYDROCHLORIDE 2 MG: 1 INJECTION, SOLUTION INTRAMUSCULAR; INTRAVENOUS at 14:44

## 2024-08-14 RX ADMIN — PROPOFOL 30 MG: 10 INJECTION, EMULSION INTRAVENOUS at 14:49

## 2024-08-14 RX ADMIN — SODIUM CHLORIDE, SODIUM LACTATE, POTASSIUM CHLORIDE, AND CALCIUM CHLORIDE: .6; .31; .03; .02 INJECTION, SOLUTION INTRAVENOUS at 14:35

## 2024-08-14 NOTE — OP NOTE
OPERATIVE REPORT  PATIENT NAME: Shakir Montiel    :  1965  MRN: [id number]  Pt Location: [facility] OR [facility]OM 04    SURGERY DATE: 2024     Surgeons and Role:     * Ulices Crespo MD - Primary     * Fadumo Yuen PA-C - Assisting    Physician Assistant need: A physician assistant was required during the procedure for retraction, tissue handling, dissection, and suturing. No qualified resident was available.    Pre-Op Diagnosis Codes:   Left index finger nail mass    Post-Op Diagnosis Codes:  Left index finger nail mass    Procedure(s) (LRB):  EXCISION FINGER NAILBED MASS - LEFT INDEX FINGER (Left)    Specimen(s):  ID Type Source Tests Collected by Time Destination   1 : Left index finger nailbed mass Tissue Finger, Left TISSUE EXAM Ulices Crespo MD 2024 1505        Estimated Blood Loss:   Minimal    Drains:  None    Implants:  * No implants in log *    Anesthesia Type:   IV Sedation with Anesthesia    Operative Indications:  Patient is a 59 y.o. male evaluated in clinic with symptoms and clinical exam consistent with Left index finger nail mass.  We discussed treatment options with patient including conservative management and surgical management. Discussed nonoperative management with observation.  We discussed risk of surgery including pain, bleeding, stiffness, damage to neurovascular structures, infection, need for therapy, recurrence of mass.  We discussed low, but real possibility of malignant process and need for further surgical management. Patient elected for surgical management with mass excision.  Informed consent was obtained.     Operative Findings:  Left index finger nail mass that was able to be marginally excised.       Complications:   None     Procedure and Technique:  Patient was identified in the preoperative holding area.  Surgical sites were marked with patient participation. Patient was taken back to the operating theater.  Anesthesia was induced. Extremity was  prepped and draped in typical fashion.  Formal time-out was performed confirming site, patient, and procedure. All present were in agreement. A 50-50 mixture of 1% lidocaine without epinephrine and 0.25% bupivacaine without epinephrine was used for digital block.        Mass was identified over radial aspect of fingernail.  Nail was removed from the digit.  Mass was excised marginally from normal tissues.  Mass was excised beneath the epionychium.  A curette was used to debride the sterile matrix proximally.  Mass was firm and was measured to be 14 mm long by 8 mm wide by 6 mm high. Mass was sent for pathologic examination. Wound was inspected. There was no evidence of remaining mass.  Wound was irrigated.  A small amount of bone over the radial aspect of the fingertip was removed to help facilitate closure.  Skin was closed with 4-0 chromic in horizontal mattress fashion.  Wounds were dressed with Xeroform, 4x4s, rosemary, and finger sock.   Tourniquet was deflated.  Patient was taken to PACU in stable condition.     I was present for the entire procedure     Postoperative Plan:  Patient may range digits as tolerated.  We will limit weightbearing to a couple of pounds for the first 2 weeks.  We will see patient back at the 2-week postoperative carmenza.    Patient Disposition:  PACU         SIGNATURE: Ulices Crespo MD  DATE: August 14, 2024  TIME: 3:29 PM

## 2024-08-14 NOTE — DISCHARGE INSTR - AVS FIRST PAGE
Post Operative Instructions    You have had surgery on your arm today, please read and follow the information below:  Elevate your hand above your elbow during the next 24-48 hours to help with swelling.  Place your hand and arm over your head with motion at your shoulder three times a day.  Do not apply any cream/ointment/oil to your incisions including antibiotics (I.e.Neosporin)  Do not use peroxide to clean your wound   Do not soak your hands in standing water (dishwater, tubs, Jacuzzi's, pools, etc.) until given permission (typically 2-3 weeks after injury)    Call the office if you notice any:  Increased numbness or tingling of your hand or fingers that is not relieved with elevation.  Increasing pain that is not controlled with medication.  Difficulty chewing, breathing, swallowing.  Chest pains or shortness of breath.  Fever over 101.4 degrees.    Bandage: Do NOT remove bandage until follow-up appointment.    Motion: Move fingers into a fist 5 times a day, DO NOT move any splinted fingers.    Weight bearing status: Avoid heavy lifting (>2 pounds) with the extremity that was operated on until follow up appointment. Normal activities of daily living are OK.    Ice: Ice for 10 minutes every hour as needed for swelling x 24 hours.    Sling: No sling necessary.    Pain medication:   Norco/Hydrocodone one tab every 6 hours AS NEEDED for pain  *   You may take Tylenol (acetaminophen) in addition to your pain medication. This is over the counter. Please follow the instructions on the bottle. BE AWARE - your pain medication (hydrocodone/oxycodone) may already include acetaminophen in it. If it does, you must include this in your daily amount and make sure not take more than 3000 mg per day.   *   You may take an antiinflammatory medication (i.e. ibuprofen/naproxen) in addition to your pain medication. These are found over the counter, but higher prescription doses are available if needed. Please follow the dosing  instructions on the bottle and it is recommended you take these with food. DO NOT take these medications if you are on a blood thinner, have history of gastrointestinal bleeding, chronic kidney disease, or if you have ever been told by a physician not to. You CAN take this with Tylenol (acetaminophen), but should only take ONE antiinflammatory at a time.    Antibiotics:  None     Therapy: No therapy needed at this time.    Follow-up Appointment: 10-14 days.        Please call the office if you have any questions or concerns regarding your post-operative care.

## 2024-08-14 NOTE — H&P
H&P reviewed. After examining the patient I find no changes in the patients condition since the H&P had been written. Surgical site marked with patient participation. All questions were answered.    Vitals:    08/14/24 1339   BP: 154/84   Pulse: 62   Resp: 20   Temp: (!) 97.2 °F (36.2 °C)   SpO2: 99%     ORTHOPAEDIC HAND, WRIST, AND ELBOW OFFICE  VISIT        ASSESSMENT/PLAN:       Diagnoses and all orders for this visit:     Nail lesion  -     XR finger left second digit-index; Future  -     Case request operating room: EXCISION FINGER MASS - LEFT INDEX FINGER  POSSIBLE SOFT TISSUE COVERAGE; Standing  -     Ambulatory referral to [facility]; Future  -     Case request operating room: EXCISION FINGER MASS - LEFT INDEX FINGER  POSSIBLE SOFT TISSUE COVERAGE     Trigger middle finger of right hand  -     Ambulatory Referral to Orthopedic Surgery  -     Hand/upper extremity injection: R long A1  -     lidocaine (XYLOCAINE) 0.5 % injection 1 mL  -     betamethasone acetate-betamethasone sodium phosphate (CELESTONE) injection 6 mg     Other orders  -     Diet NPO; Sips with meds; Standing  -     Nursing Communication Warmimg Interventions Implemented; Standing  -     Nursing Communication CHG bath, have staff wash entire body (neck down) per pre-op bathing protocol. Routine, evening prior to, and day of surgery.; Standing  -     Nursing Communication Swab both nares with Povidone-Iodine solution, EXCLUDE if patient has shellfish/Iodine allergy, and replace with nasal alcohol swabstick. Routine, day of surgery, on call to OR; Standing  -     chlorhexidine (PERIDEX) 0.12 % oral rinse 15 mL  -     Void on call to OR; Standing  -     Insert peripheral IV; Standing  -     ceFAZolin (ANCEF) 2,000 mg in dextrose 5 % 100 mL IVPB              58 y.o. male with right long TF and left index finger radial aspect nail lesion that was previous excised in 2/2023     Discussed with the patient that his signs and symptoms are  consistent with right long TF. Treatment options for the trigger finger was discussed in the form of a steroid injection. The patient was agreeable to this. He consented and underwent a right long finger trigger finger injection in the office today without any complications. The patient also has a nail lesion radial aspect of his left index finger. He did have this excised in [place] in February of 2023. The pathology was reviewed in the office today which was consistent with fibroma. A clinical images was taken and uploaded into the patient's chart. A photo of the pathology report was also taken and uploaded into the patient's chart under the media tab. Treatment options were discussed. The patient elected to proceed with left index finger excision of lesion with possible soft tissue coverage. Discussed risk of recurrence as this did come back once. Risks of the surgery are inclusive of but not limited to bleeding, infection, nerve injury, blood clot, worsening of symptoms, not achieving the anticipated results, persistent stiffness, weakness and the need for additional surgery. The patient verbally stated they understood those risks and would like to proceed with the surgery. Surgical consent was signed in the office today. I will see him the day of surgery.           Follow Up:  After surgery         To Do Next Visit:  Sutures out        Discussions:  Trigger Finger: The anatomy and physiology of trigger finger was discussed with the patient today in the office.  Edema and increased contact pressure within the flexor tendons at the A1 pulley can cause pain, crepitation, and triggering or locking of the digit resulting in limitation of function.  Symptoms can occur at anytime but are typically worse in the morning or after a brief rest from repetitive activity.  Treatment options include resting/nighttime MP blocking splints to decrease edema, oral anti-inflammatory medications, home or formal therapy exercises, up  to 2 steroid injections within the tendon sheath, or surgical release.  While majority of patients do respond to conservative treatment, up to 20% may require surgical release.         Ulices Crespo MD  Attending, Orthopaedic Surgery  Hand, Wrist, and Elbow Surgery  [facility]     ______________________________________________________________________________________________     CHIEF COMPLAINT:      Chief Complaint   Patient presents with    Right Middle Finger - Pain         SUBJECTIVE:  Patient is a 58 y.o. ambidextrous male who presents today for evaluation and treatment of right long finger pain. The patient notes a history of a puncture wound to right palm.  On his left index finger radial aspect he notes a nail lesion that was previous excised in [place] in February of 2023. He states he has this for 12 years and it has came back after the excision. The  previous pathology was consistent with fibroma. The patient is referred by eNena Aly MD. Feb 2023     Occupation: puts away projects       I have personally reviewed all the relevant PMH, PSH, SH, FH, Medications and allergies        PAST MEDICAL HISTORY:  Medical History        Past Medical History:   Diagnosis Date    Fatty liver      Gastritis              PAST SURGICAL HISTORY:  Surgical History         Past Surgical History:   Procedure Laterality Date    APPENDECTOMY                FAMILY HISTORY:  Family History         Family History   Problem Relation Age of Onset    Pulmonary fibrosis Mother      Pancreatic cancer Father              SOCIAL HISTORY:  Social History   Social History            Tobacco Use    Smoking status: Never       Passive exposure: Never    Smokeless tobacco: Never   Vaping Use    Vaping status: Never Used   Substance Use Topics    Alcohol use: Yes       Comment: socially            MEDICATIONS:    Current Medications      Current Outpatient Medications:     valsartan (DIOVAN) 80 mg tablet, Take 1  tablet (80 mg total) by mouth daily, Disp: 30 tablet, Rfl: 6  No current facility-administered medications for this visit.        ALLERGIES:  Allergies   No Known Allergies              REVIEW OF SYSTEMS:  Musculoskeletal:        As noted in HPI.   All other systems reviewed and are negative.     VITALS:  There were no vitals filed for this visit.     LABS:  HgA1c:         Lab Results   Component Value Date     HGBA1C 5.7 02/16/2024      BMP:         Lab Results   Component Value Date     CALCIUM 9.5 02/15/2024     K 3.8 02/15/2024     CO2 29 02/15/2024      02/15/2024     BUN 14 02/15/2024     CREATININE 0.91 02/15/2024         _____________________________________________________  PHYSICAL EXAMINATION:  General: Well developed and well nourished, alert & oriented x 3, appears comfortable  Psychiatric: Normal  HEENT: Normocephalic, Atraumatic Trachea Midline, No torticollis  Pulmonary: No audible wheezing or respiratory distress   Abdomen/GI: Non tender, non distended   Cardiovascular: No pitting edema, 2+ radial pulse   Skin: No masses, erythema, lacerations, fluctation, ulcerations  Neurovascular: Sensation Intact to the Median, Ulnar, Radial Nerve, Motor Intact to the Median, Ulnar, Radial Nerve, and Pulses Intact  Musculoskeletal: Normal, except as noted in detailed exam and in HPI.        MUSCULOSKELETAL EXAMINATION:  Right long finger   + click  TTP A1 pulley      Left index finger  Radial aspect nail lesion  6 mm wide by 12 mm long by 2 mm high hypertrophic tissue over the radial aspect of the nail  Composite fist  Full digit extension  ___________________________________________________  STUDIES REVIEWED:  Xrays of the left index finger were reviewed and independently interpreted in PACS by Dr. Crespo and demonstrate no osseous abnormalities.             PROCEDURES PERFORMED:  Hand/upper extremity injection: R long A1  Universal Protocol:  Consent: Verbal consent obtained.  Risks and benefits:  "risks, benefits and alternatives were discussed  Consent given by: patient  Time out: Immediately prior to procedure a \"time out\" was called to verify the correct patient, procedure, equipment, support staff and site/side marked as required.  Patient understanding: patient states understanding of the procedure being performed  Site marked: the operative site was marked  Patient identity confirmed: verbally with patient  Supporting Documentation  Indications: tendon swelling   Procedure Details  Condition:trigger finger Location: long finger - R long A1   Preparation: Patient was prepped and draped in the usual sterile fashion  Needle size: 25 G  Approach: volar  Medications administered: 6 mg betamethasone acetate-betamethasone sodium phosphate 6 (3-3) mg/mL; 1 mL lidocaine 0.5 %  Patient tolerance: patient tolerated the procedure well with no immediate complications  Dressing:  Sterile dressing applied                 _____________________________________________________             Scribe Attestation    I,:  Cassi Gonzalez MA am acting as a scribe while in the presence of the attending physician.:       I,:  Ulices Crespo MD personally performed the services described in this documentation    as scribed in my presence.:                     Electronically signed by Ulices Crespo MD at 3/7/2024 10:39 AM  "

## 2024-08-14 NOTE — LETTER
[facility]  [address]  [place] [address]  Dept: [phone]  Ortho: [phone]    August 14, 2024     Patient: Shakir Montiel   YOB: 1965   Date of Visit: 8/14/2024       To Whom it May Concern:    Shakir Montiel is under my professional care. He was seen in the hospital on 8/14/2024.Please excuse patient from work for the next 30 days. Thank you.    If you have any questions or concerns, please don't hesitate to call.         Sincerely,          Fadumo Yuen PA-C

## 2024-08-14 NOTE — ANESTHESIA POSTPROCEDURE EVALUATION
Post-Op Assessment Note    CV Status:  Stable  Pain Score: 0    Pain management: adequate       Mental Status:  Awake and alert   Hydration Status:  Stable   PONV Controlled:  None   Airway Patency:  Patent and adequate     Post Op Vitals Reviewed: Yes    No anethesia notable event occurred.    Staff: CRNA, Anesthesiologist               BP   108/60   Temp   97.9   Pulse  50   Resp   16   SpO2   99

## 2024-08-16 PROCEDURE — 88313 SPECIAL STAINS GROUP 2: CPT | Performed by: PATHOLOGY

## 2024-08-16 PROCEDURE — 88305 TISSUE EXAM BY PATHOLOGIST: CPT | Performed by: PATHOLOGY

## 2024-08-22 ENCOUNTER — OFFICE VISIT (OUTPATIENT)
Dept: FAMILY MEDICINE CLINIC | Facility: CLINIC | Age: 59
End: 2024-08-22
Payer: COMMERCIAL

## 2024-08-22 VITALS
HEART RATE: 75 BPM | OXYGEN SATURATION: 98 % | TEMPERATURE: 98.3 F | DIASTOLIC BLOOD PRESSURE: 72 MMHG | SYSTOLIC BLOOD PRESSURE: 122 MMHG | BODY MASS INDEX: 24.92 KG/M2 | WEIGHT: 146 LBS | HEIGHT: 64 IN | RESPIRATION RATE: 16 BRPM

## 2024-08-22 DIAGNOSIS — R73.03 PREDIABETES: ICD-10-CM

## 2024-08-22 DIAGNOSIS — K80.20 GALLSTONES: ICD-10-CM

## 2024-08-22 DIAGNOSIS — I10 PRIMARY HYPERTENSION: ICD-10-CM

## 2024-08-22 DIAGNOSIS — K29.50 OTHER CHRONIC GASTRITIS WITHOUT HEMORRHAGE: ICD-10-CM

## 2024-08-22 DIAGNOSIS — E78.5 DYSLIPIDEMIA: ICD-10-CM

## 2024-08-22 DIAGNOSIS — Z12.11 COLON CANCER SCREENING: Primary | ICD-10-CM

## 2024-08-22 DIAGNOSIS — N52.9 ED (ERECTILE DYSFUNCTION) OF ORGANIC ORIGIN: ICD-10-CM

## 2024-08-22 PROBLEM — R73.09 ABNORMAL GLUCOSE LEVEL: Status: RESOLVED | Noted: 2024-02-16 | Resolved: 2024-08-22

## 2024-08-22 PROCEDURE — 99214 OFFICE O/P EST MOD 30 MIN: CPT | Performed by: FAMILY MEDICINE

## 2024-08-22 RX ORDER — SILDENAFIL 50 MG/1
50 TABLET, FILM COATED ORAL DAILY PRN
Qty: 10 TABLET | Refills: 1 | Status: SHIPPED | OUTPATIENT
Start: 2024-08-22

## 2024-08-22 NOTE — ASSESSMENT & PLAN NOTE
Had EGD scheduled per GI but pt cancelled now no symptoms  pt does not wish to pursue may consider to do in [place]

## 2024-08-22 NOTE — PROGRESS NOTES
Ambulatory Visit  Name: Shakir Montiel      : 1965      MRN: [id number]  Encounter Provider: Neena Aly MD  Encounter Date: 2024   Encounter department: [facility] MEDICINE    Assessment & Plan   1. Colon cancer screening  Assessment & Plan:  To GI  pt does have EGD scheduled  2. Dyslipidemia  Assessment & Plan:  Due for lipid panel  Orders:  -     Lipid panel; Future; Expected date: 2024  3. Prediabetes  Assessment & Plan:  Discussion on diet and exercise guidelines for weight loss and  health reviewed with pt     4. Primary hypertension  Assessment & Plan:  Well controlled on current therapy continue with current medications and will reassess next visit;    5. Other chronic gastritis without hemorrhage  Assessment & Plan:  Had EGD scheduled per GI but pt cancelled now no symptoms  pt does not wish to pursue may consider to do in [place]  6. Gallstones  Assessment & Plan:  Asymptomatic   7. ED (erectile dysfunction) of organic origin  Assessment & Plan:  Offered pt viagra 50mg 1 hour  prior to relations   Orders:  -     sildenafil (VIAGRA) 50 MG tablet; Take 1 tablet (50 mg total) by mouth daily as needed for erectile dysfunction  -     Ambulatory Referral to Urology; Future     History of Present Illness     Pt is here for interval visit and evaluation of multiple medical problems, review of medications, labs, Health Maintenance and any recent specialty consults; hand surgeon          Review of Systems   Constitutional:  Negative for appetite change, chills, fatigue and fever.   Respiratory:  Negative for cough, chest tightness and shortness of breath.    Cardiovascular:  Negative for chest pain, palpitations and leg swelling.   Gastrointestinal:  Negative for abdominal pain, constipation, diarrhea, nausea and vomiting.   Genitourinary:  Negative for difficulty urinating and frequency.   Musculoskeletal:  Negative for arthralgias, back pain, gait problem and neck  "pain.   Skin:  Negative for rash.   Neurological:  Negative for dizziness, weakness, light-headedness, numbness and headaches.   Hematological:  Does not bruise/bleed easily.   Psychiatric/Behavioral:  Negative for dysphoric mood and sleep disturbance. The patient is not nervous/anxious.        Objective     /72 (BP Location: Left arm, Patient Position: Sitting, Cuff Size: Standard)   Pulse 75   Temp 98.3 °F (36.8 °C) (Tympanic)   Resp 16   Ht 5' 4\" (1.626 m)   Wt 66.2 kg (146 lb)   SpO2 98%   BMI 25.06 kg/m²     Physical Exam  Vitals and nursing note reviewed.   Constitutional:       General: He is not in acute distress.     Appearance: Normal appearance. He is well-developed.   Eyes:      Conjunctiva/sclera: Conjunctivae normal.      Pupils: Pupils are equal, round, and reactive to light.   Neck:      Thyroid: No thyromegaly.   Cardiovascular:      Rate and Rhythm: Normal rate and regular rhythm.      Pulses: Normal pulses.      Heart sounds: Normal heart sounds. No murmur heard.     No friction rub.   Pulmonary:      Effort: Pulmonary effort is normal. No respiratory distress.      Breath sounds: Normal breath sounds. No wheezing.   Abdominal:      General: Bowel sounds are normal. There is no distension.      Palpations: Abdomen is soft. There is no mass.      Tenderness: There is no abdominal tenderness. There is no guarding.      Hernia: No hernia is present.   Musculoskeletal:      Cervical back: Normal range of motion and neck supple.      Right lower leg: No edema.      Left lower leg: No edema.   Lymphadenopathy:      Cervical: No cervical adenopathy.   Skin:     General: Skin is warm and dry.      Findings: No erythema or rash.   Neurological:      General: No focal deficit present.      Mental Status: He is alert and oriented to person, place, and time.      Cranial Nerves: No cranial nerve deficit.      Motor: No weakness.      Gait: Gait normal.      Deep Tendon Reflexes: Reflexes normal. "   Psychiatric:         Mood and Affect: Mood normal.       Administrative Statements

## 2024-08-23 ENCOUNTER — NURSE TRIAGE (OUTPATIENT)
Dept: OTHER | Facility: OTHER | Age: 59
End: 2024-08-23

## 2024-08-23 ENCOUNTER — TELEPHONE (OUTPATIENT)
Dept: ADMINISTRATIVE | Facility: OTHER | Age: 59
End: 2024-08-23

## 2024-08-23 NOTE — TELEPHONE ENCOUNTER
Via Interpretor patient states that he would like to make an appointment with a Thai speaking interpretor at the [place] office. He has an appointment Monday morning so please call him back after 1300.

## 2024-08-23 NOTE — TELEPHONE ENCOUNTER
----- Message from Neena Aly MD sent at 8/22/2024  4:30 PM EDT -----  Colonoscopy in [place] 2/24  normal repeat 10 yrs

## 2024-08-23 NOTE — TELEPHONE ENCOUNTER
Upon review of the In Basket request we were able to locate, review, and update the patient chart as requested for CRC: Colonoscopy.    Any additional questions or concerns should be emailed to the Practice Liaisons via the appropriate education email address, please do not reply via In Basket.    Thank you  Daria De Leon MA   [facility]

## 2024-08-26 ENCOUNTER — OFFICE VISIT (OUTPATIENT)
Dept: OBGYN CLINIC | Facility: CLINIC | Age: 59
End: 2024-08-26
Payer: COMMERCIAL

## 2024-08-26 VITALS — OXYGEN SATURATION: 97 % | WEIGHT: 146 LBS | BODY MASS INDEX: 24.92 KG/M2 | HEIGHT: 64 IN | HEART RATE: 73 BPM

## 2024-08-26 DIAGNOSIS — Z98.890 S/P NAIL SURGERY: Primary | ICD-10-CM

## 2024-08-26 DIAGNOSIS — M65.331 TRIGGER MIDDLE FINGER OF RIGHT HAND: ICD-10-CM

## 2024-08-26 PROCEDURE — 99024 POSTOP FOLLOW-UP VISIT: CPT | Performed by: STUDENT IN AN ORGANIZED HEALTH CARE EDUCATION/TRAINING PROGRAM

## 2024-08-26 PROCEDURE — 20550 NJX 1 TENDON SHEATH/LIGAMENT: CPT | Performed by: STUDENT IN AN ORGANIZED HEALTH CARE EDUCATION/TRAINING PROGRAM

## 2024-08-26 RX ORDER — BETAMETHASONE SODIUM PHOSPHATE AND BETAMETHASONE ACETATE 3; 3 MG/ML; MG/ML
6 INJECTION, SUSPENSION INTRA-ARTICULAR; INTRALESIONAL; INTRAMUSCULAR; SOFT TISSUE
Status: COMPLETED | OUTPATIENT
Start: 2024-08-26 | End: 2024-08-26

## 2024-08-26 RX ADMIN — BETAMETHASONE SODIUM PHOSPHATE AND BETAMETHASONE ACETATE 6 MG: 3; 3 INJECTION, SUSPENSION INTRA-ARTICULAR; INTRALESIONAL; INTRAMUSCULAR; SOFT TISSUE at 10:45

## 2024-08-26 NOTE — PROGRESS NOTES
"Assessment/Plan:  Patient ID: 59 y.o. male   Surgery: Excision Finger Nailbed Mass - Left Index Finger - Left  Date of Surgery: 8/14/2024    Patient is doing well postoperatively.  Sutures were left intact today, as they were not ready to be removed.  Patient was instructed to keep the surgical site clean and dry.  Pathology results were reviewed with the patient and his family member today.  Patient was concerned regarding the triggering of his right long finger today.  Corticosteroid injection was offered and administered to the right long finger A1 pulley.    Follow Up:  6 weeks    To Do Next Visit:  Sutures out      CHIEF COMPLAINT:  Chief Complaint   Patient presents with   • Left Index Finger - Post-op         SUBJECTIVE:  Patient is a 59 y.o. year old male who presents for follow up after Excision Finger Nailbed Mass - Left Index Finger - Left. Today patient states pain is well controlled. Patient states his right long finger has been triggering again. He has previously had one corticosteroid injection of the A1 pulley of the right long finger on 3/7/2024.      PHYSICAL EXAMINATION:  General: Well developed and well nourished, alert and oriented x 3, appears comfortable  Psychiatric: Normal    MUSCULOSKELETAL EXAMINATION:  Incision: Clean, dry, intact  Surgery Site: normal, no evidence of infection   Range of Motion: Limited due to stiffness, pulp to palm distance of index finger 4 cm  Neurovascular status: Neuro intact, good cap refill         STUDIES REVIEWED:  Lab results were reviewed and independently interpreted by Dr. Crespo and demonstrate onychocromaticoma of the left index fingernail.        PROCEDURES PERFORMED:  Hand/upper extremity injection: R long A1  Universal Protocol:  Consent: Verbal consent obtained.  Risks and benefits: risks, benefits and alternatives were discussed  Consent given by: patient  Time out: Immediately prior to procedure a \"time out\" was called to verify the correct " patient, procedure, equipment, support staff and site/side marked as required.  Patient understanding: patient states understanding of the procedure being performed  Site marked: the operative site was marked  Patient identity confirmed: verbally with patient  Supporting Documentation  Indications: pain, tendon swelling and therapeutic   Procedure Details  Condition:trigger finger Location: long finger - R long A1   Preparation: Patient was prepped and draped in the usual sterile fashion  Needle size: 25 G  Approach: volar  Medications administered: 6 mg betamethasone acetate-betamethasone sodium phosphate 6 (3-3) mg/mL (1 mL ropivacaine)  Patient tolerance: patient tolerated the procedure well with no immediate complications  Dressing:  Sterile dressing applied            Scribe Attestation    I,:  Mirtha Martinez am acting as a scribe while in the presence of the attending physician.:       I,:  Ulices Crespo MD personally performed the services described in this documentation    as scribed in my presence.:

## 2024-08-26 NOTE — LETTER
August 26, 2024     Patient: Shakir Montiel  YOB: 1965  Date of Visit: 8/26/2024      To Whom it May Concern:    Shakir Montiel is under my professional care. Shakir was seen in my office on 8/26/2024. Shakir may return to work on 9/11/2024 .    If you have any questions or concerns, please don't hesitate to call.         Sincerely,          Ulices Crespo MD        CC: No Recipients

## 2024-08-26 NOTE — LETTER
August 26, 2024     Patient: Shakir Montiel  YOB: 1965  Date of Visit: 8/26/2024      To Whom it May Concern:    Shakir Montiel is under my professional care. Shakir was seen in my office on 8/26/2024. Shakir may return to work on 9/11/2024. He underwent hand surgery on 8/14/2024 .    If you have any questions or concerns, please don't hesitate to call.         Sincerely,          Ulices Crespo MD        CC: No Recipients

## 2024-09-07 DIAGNOSIS — I10 PRIMARY HYPERTENSION: ICD-10-CM

## 2024-09-07 RX ORDER — VALSARTAN 80 MG/1
80 TABLET ORAL DAILY
Qty: 30 TABLET | Refills: 5 | Status: SHIPPED | OUTPATIENT
Start: 2024-09-07

## 2024-09-21 PROBLEM — Z12.11 COLON CANCER SCREENING: Status: RESOLVED | Noted: 2024-08-22 | Resolved: 2024-09-21

## 2024-09-23 DIAGNOSIS — N52.9 ED (ERECTILE DYSFUNCTION) OF ORGANIC ORIGIN: ICD-10-CM

## 2024-09-24 RX ORDER — SILDENAFIL 50 MG/1
50 TABLET, FILM COATED ORAL DAILY PRN
Qty: 10 TABLET | Refills: 0 | Status: SHIPPED | OUTPATIENT
Start: 2024-09-24

## 2024-10-03 ENCOUNTER — TELEPHONE (OUTPATIENT)
Dept: UROLOGY | Facility: CLINIC | Age: 59
End: 2024-10-03

## 2024-10-07 VITALS — WEIGHT: 146 LBS | BODY MASS INDEX: 24.92 KG/M2 | HEIGHT: 64 IN

## 2024-10-07 DIAGNOSIS — Z98.890 POSTOPERATIVE STATE: Primary | ICD-10-CM

## 2024-10-07 PROCEDURE — 99024 POSTOP FOLLOW-UP VISIT: CPT | Performed by: STUDENT IN AN ORGANIZED HEALTH CARE EDUCATION/TRAINING PROGRAM

## 2024-10-07 NOTE — PROGRESS NOTES
Assessment/Plan:  Patient ID: 59 y.o. male   Surgery: Excision Finger Nailbed Mass - Left Index Finger - Left  Date of Surgery: 8/14/2024    Resume activities as tolerated. It was discussed that the tip of his finger is hypersensitive, and he can work on this by using it and feeling different textures. He will follow up with the clinic as needed.   Offered therapy to work on ROM. Patient politely declined.    Follow Up:  PRN    To Do Next Visit:  Re-evaluation of current issue      CHIEF COMPLAINT:  Chief Complaint   Patient presents with    Left Index Finger - Follow-up         SUBJECTIVE:  Patient is a 59 y.o. year old male who presents for follow up after Excision Finger Nailbed Mass - Left Index Finger - Left. Today patient states he is doing well today. He does state that the tip of the left index finger is sensitive.         PHYSICAL EXAMINATION:  General: Well developed and well nourished, alert and oriented x 3, appears comfortable  Psychiatric: Normal    MUSCULOSKELETAL EXAMINATION:  Incision: Clean, dry, intact  Surgery Site: normal, no evidence of infection   Range of Motion: Mild stiffness to index finger, pulp to palm distance 5 mm of index finger  Neurovascular status: Neuro intact, good cap refill       Left index finger   Sensitive to touch   Full digit extension           STUDIES REVIEWED:  No new studies to review       PROCEDURES PERFORMED:  Procedures  No Procedures performed today    Scribe Attestation      I,:   am acting as a scribe while in the presence of the attending physician.:       I,:   personally performed the services described in this documentation    as scribed in my presence.:

## 2024-10-18 DIAGNOSIS — N52.9 IMPOTENCE: Primary | ICD-10-CM

## 2024-10-18 RX ORDER — TADALAFIL 20 MG/1
TABLET ORAL
Qty: 10 TABLET | Refills: 3 | Status: SHIPPED | OUTPATIENT
Start: 2024-10-18

## 2024-11-19 DIAGNOSIS — N52.9 IMPOTENCE: Primary | ICD-10-CM

## 2024-11-19 RX ORDER — SILDENAFIL 50 MG/1
50 TABLET, FILM COATED ORAL DAILY PRN
Qty: 10 TABLET | Refills: 5 | Status: SHIPPED | OUTPATIENT
Start: 2024-11-19

## 2024-12-11 NOTE — PROGRESS NOTES
Name: Shakir Montiel      : 1965      MRN: [id number]  Encounter Provider: Kevin Contreras MD  Encounter Date: 2024   Encounter department: [facility] UROLOGY FLEX  :  Assessment & Plan  ED (erectile dysfunction) of organic origin  Trouble getting and maintaining  Can use PRN viagra (safe for use while on maintenance daily cialis)    Orders:    Ambulatory Referral to Urology    Testosterone, free, total; Future    tadalafil (CIALIS) 5 MG tablet; Take 1 tablet (5 mg total) by mouth in the morning    sildenafil (VIAGRA) 50 MG tablet; Take 2 tablets (100 mg total) by mouth daily as needed for erectile dysfunction  prescription drug management  Balanitis    Orders:    clotrimazole-betamethasone (LOTRISONE) 1-0.05 % cream; Apply topically 2 (two) times a day for 14 days    Benign prostatic hyperplasia without lower urinary tract symptoms  PSA normal  Recommend dialis for daily use (5 mg po daily)  Risks, benefits, and alternatives discussed with him in Lithuanian today           No special testing  Please room him  Can up his viagra dose and check testosterone levels  Consider ICI or implant placement    History of Present Illness   Shakir Montiel is a 59 y.o. male who presents as referred by Neena Aly MD for ED and BPH  Also complains of intermittent cracks and irritation on his penis (likely balanitis)  Appears circumcised  Looks after himself and tries to stay healthy    The following portions of the patient's history were reviewed and updated as appropriate: allergies, current medications, past family history, past medical history, past social history, past surgical history and problem list.      Review of Systems   Constitutional: Negative.    HENT: Negative.     Eyes: Negative.    Respiratory: Negative.     Cardiovascular: Negative.    Gastrointestinal: Negative.    Endocrine: Negative.    Genitourinary:         As per HPI   Musculoskeletal: Negative.    Skin: Negative.     Allergic/Immunologic: Negative.    Neurological: Negative.    Hematological: Negative.    Psychiatric/Behavioral: Negative.          Objective   There were no vitals taken for this visit.    Physical Exam  Vitals and nursing note reviewed.   Constitutional:       General: He is not in acute distress.     Appearance: Normal appearance. He is well-developed. He is not ill-appearing, toxic-appearing or diaphoretic.   HENT:      Head: Normocephalic and atraumatic.   Eyes:      General: No scleral icterus.  Pulmonary:      Effort: Pulmonary effort is normal. No respiratory distress.   Abdominal:      General: There is no distension.      Palpations: Abdomen is soft.      Tenderness: There is no abdominal tenderness.   Genitourinary:     Comments: Appears circumcised  Small cracks in penile shaft skin appearing non infected  Normal meatus  Normal marlon stage  Musculoskeletal:         General: No deformity.      Cervical back: Neck supple.   Skin:     Coloration: Skin is not jaundiced or pale.   Neurological:      Mental Status: He is alert and oriented to person, place, and time. Mental status is at baseline.      Cranial Nerves: No cranial nerve deficit.      Motor: No weakness.      Coordination: Coordination normal.   Psychiatric:         Mood and Affect: Mood normal.         Behavior: Behavior normal.         Thought Content: Thought content normal.         Judgment: Judgment normal.          Results  Lab Results   Component Value Date    PSA 1.03 02/15/2024   Normal, low risk of prostate cancer (He did not want a prostate exam today)    Lab Results   Component Value Date    CALCIUM 9.5 02/15/2024    K 3.8 02/15/2024    CO2 29 02/15/2024     02/15/2024    BUN 14 02/15/2024    CREATININE 0.91 02/15/2024     Lab Results   Component Value Date    WBC 6.21 02/15/2024    HGB 17.0 02/15/2024    HCT 50.5 (H) 02/15/2024    MCV 92 02/15/2024     02/15/2024       Office Urine Dip  No results found for this or  any previous visit (from the past hour).]

## 2024-12-12 ENCOUNTER — CONSULT (OUTPATIENT)
Dept: UROLOGY | Facility: CLINIC | Age: 59
End: 2024-12-12
Payer: COMMERCIAL

## 2024-12-12 VITALS
HEIGHT: 64 IN | BODY MASS INDEX: 24.92 KG/M2 | DIASTOLIC BLOOD PRESSURE: 82 MMHG | WEIGHT: 146 LBS | SYSTOLIC BLOOD PRESSURE: 142 MMHG

## 2024-12-12 DIAGNOSIS — N40.0 BENIGN PROSTATIC HYPERPLASIA WITHOUT LOWER URINARY TRACT SYMPTOMS: ICD-10-CM

## 2024-12-12 DIAGNOSIS — N52.9 ED (ERECTILE DYSFUNCTION) OF ORGANIC ORIGIN: ICD-10-CM

## 2024-12-12 DIAGNOSIS — N48.1 BALANITIS: Primary | ICD-10-CM

## 2024-12-12 PROCEDURE — 99204 OFFICE O/P NEW MOD 45 MIN: CPT | Performed by: UROLOGY

## 2024-12-12 RX ORDER — TADALAFIL 5 MG/1
5 TABLET ORAL DAILY
Qty: 90 TABLET | Refills: 3 | Status: SHIPPED | OUTPATIENT
Start: 2024-12-12 | End: 2025-12-07

## 2024-12-12 RX ORDER — SILDENAFIL 50 MG/1
100 TABLET, FILM COATED ORAL DAILY PRN
Qty: 15 TABLET | Refills: 6 | Status: SHIPPED | OUTPATIENT
Start: 2024-12-12 | End: 2025-02-03

## 2024-12-12 RX ORDER — CLOTRIMAZOLE AND BETAMETHASONE DIPROPIONATE 10; .64 MG/G; MG/G
CREAM TOPICAL 2 TIMES DAILY
Qty: 45 G | Refills: 3 | Status: SHIPPED | OUTPATIENT
Start: 2024-12-12 | End: 2024-12-26

## 2024-12-12 NOTE — ASSESSMENT & PLAN NOTE
Trouble getting and maintaining  Can use PRN viagra (safe for use while on maintenance daily cialis)    Orders:    Ambulatory Referral to Urology    Testosterone, free, total; Future    tadalafil (CIALIS) 5 MG tablet; Take 1 tablet (5 mg total) by mouth in the morning    sildenafil (VIAGRA) 50 MG tablet; Take 2 tablets (100 mg total) by mouth daily as needed for erectile dysfunction  prescription drug management

## 2024-12-12 NOTE — ASSESSMENT & PLAN NOTE
PSA normal  Recommend dialis for daily use (5 mg po daily)  Risks, benefits, and alternatives discussed with him in Bulgarian today

## 2024-12-13 ENCOUNTER — OFFICE VISIT (OUTPATIENT)
Dept: OBGYN CLINIC | Facility: CLINIC | Age: 59
End: 2024-12-13
Payer: COMMERCIAL

## 2024-12-13 VITALS — WEIGHT: 146 LBS | HEIGHT: 64 IN | BODY MASS INDEX: 24.92 KG/M2

## 2024-12-13 DIAGNOSIS — R22.32 FINGER MASS, LEFT: ICD-10-CM

## 2024-12-13 DIAGNOSIS — Z98.890 POSTOPERATIVE STATE: Primary | ICD-10-CM

## 2024-12-13 PROCEDURE — 99214 OFFICE O/P EST MOD 30 MIN: CPT | Performed by: STUDENT IN AN ORGANIZED HEALTH CARE EDUCATION/TRAINING PROGRAM

## 2024-12-13 NOTE — PROGRESS NOTES
ORTHOPAEDIC HAND, WRIST, AND ELBOW OFFICE  VISIT      ASSESSMENT/PLAN:      Diagnoses and all orders for this visit:    Postoperative state    Finger mass, left  -     MRI finger left wo and w contrast; Future          59 y.o. male s/p Excision Finger Nailbed Mass - Left Index Finger - Left DOS, 8/14/2024     Discussed with the patient there is no palpable mass on exam. A MRI with and without contrast of the left index finger to better evaluate for a mass.      Follow Up:  After testing       To Do Next Visit:  Re-evaluation of current issue        Ulices Crespo MD  Attending, Orthopaedic Surgery  Hand, Wrist, and Elbow Surgery  [facility]    ______________________________________________________________________________________________    CHIEF COMPLAINT:  Chief Complaint   Patient presents with    Left Index Finger - Follow-up       SUBJECTIVE:  Patient is a 59 y.o. male who presents today for follow up of Excision Finger Nailbed Mass - Left Index Finger - Left DOS, 8/14/2024. The patient states he feels as though there is a new mass to the volar aspect tip of the finger.  He states it feels like a tiny rock. He also has concerns about the nail.           I have personally reviewed all the relevant PMH, PSH, SH, FH, Medications and allergies      PAST MEDICAL HISTORY:  Past Medical History:   Diagnosis Date    Fatty liver     Gastritis     Hypertension        PAST SURGICAL HISTORY:  Past Surgical History:   Procedure Laterality Date    APPENDECTOMY      COLONOSCOPY      NV EXC SERA/VASC MAL SFT TISS HAND/FNGR SUBQ <1.5CM Left 8/14/2024    Procedure: EXCISION FINGER NAILBED MASS - LEFT INDEX FINGER;  Surgeon: Ulices Crespo MD;  Location: [facility] MAIN OR;  Service: Orthopedics       FAMILY HISTORY:  Family History   Problem Relation Age of Onset    Pulmonary fibrosis Mother     Pancreatic cancer Father        SOCIAL HISTORY:  Social History     Tobacco Use    Smoking status: Never      Passive exposure: Never    Smokeless tobacco: Never   Vaping Use    Vaping status: Never Used   Substance Use Topics    Alcohol use: Yes     Comment: socially    Drug use: Never       MEDICATIONS:    Current Outpatient Medications:     clotrimazole-betamethasone (LOTRISONE) 1-0.05 % cream, Apply topically 2 (two) times a day for 14 days, Disp: 45 g, Rfl: 3    sildenafil (VIAGRA) 50 MG tablet, Take 1 tablet (50 mg total) by mouth daily as needed for erectile dysfunction, Disp: 10 tablet, Rfl: 5    sildenafil (VIAGRA) 50 MG tablet, Take 2 tablets (100 mg total) by mouth daily as needed for erectile dysfunction, Disp: 15 tablet, Rfl: 6    tadalafil (CIALIS) 5 MG tablet, Take 1 tablet (5 mg total) by mouth in the morning, Disp: 90 tablet, Rfl: 3    valsartan (DIOVAN) 80 mg tablet, Take 1 tablet (80 mg total) by mouth daily, Disp: 30 tablet, Rfl: 5    ALLERGIES:  No Known Allergies        REVIEW OF SYSTEMS:  Musculoskeletal:        As noted in HPI.   All other systems reviewed and are negative.    VITALS:  There were no vitals filed for this visit.    LABS:  HgA1c:   Lab Results   Component Value Date    HGBA1C 5.7 02/16/2024     BMP:   Lab Results   Component Value Date    CALCIUM 9.5 02/15/2024    K 3.8 02/15/2024    CO2 29 02/15/2024     02/15/2024    BUN 14 02/15/2024    CREATININE 0.91 02/15/2024       _____________________________________________________  PHYSICAL EXAMINATION:  General: Well developed and well nourished, alert & oriented x 3, appears comfortable  Psychiatric: Normal  HEENT: Normocephalic, Atraumatic Trachea Midline, No torticollis  Pulmonary: No audible wheezing or respiratory distress   Abdomen/GI: Non tender, non distended   Cardiovascular: No pitting edema, 2+ radial pulse   Skin: No masses, erythema, lacerations, fluctation, ulcerations  Neurovascular: Sensation Intact to the Median, Ulnar, Radial Nerve, Motor Intact to the Median, Ulnar, Radial Nerve, and Pulses Intact  Musculoskeletal:  Normal, except as noted in detailed exam and in HPI.      MUSCULOSKELETAL EXAMINATION:  Left index finger   Scar well healed  Full digit extension  No erythema or signs of infection  Full composite fist   No palpable mass  ___________________________________________________  STUDIES REVIEWED:  No new studies to review         PROCEDURES PERFORMED:  Procedures  No Procedures performed today    _____________________________________________________      Scribe Attestation      I,:  Cassi Gonzalez MA am acting as a scribe while in the presence of the attending physician.:       I,:  Ulices Crespo MD personally performed the services described in this documentation    as scribed in my presence.:

## 2025-01-26 DIAGNOSIS — I10 PRIMARY HYPERTENSION: ICD-10-CM

## 2025-01-26 DIAGNOSIS — N52.9 IMPOTENCE: ICD-10-CM

## 2025-01-26 RX ORDER — VALSARTAN 80 MG/1
80 TABLET ORAL DAILY
Qty: 30 TABLET | Refills: 5 | Status: SHIPPED | OUTPATIENT
Start: 2025-01-26

## 2025-01-27 RX ORDER — SILDENAFIL 50 MG/1
50 TABLET, FILM COATED ORAL DAILY PRN
Qty: 10 TABLET | Refills: 0 | OUTPATIENT
Start: 2025-01-27

## 2025-02-10 ENCOUNTER — TELEPHONE (OUTPATIENT)
Dept: FAMILY MEDICINE CLINIC | Facility: CLINIC | Age: 60
End: 2025-02-10

## 2025-04-08 NOTE — TELEPHONE ENCOUNTER
Appt had to be bumped. Please call to reschedule non urgently with any AP or MD   
I called and LVM for patient to call back and reschedule.   
No

## (undated) DEVICE — NEEDLE 25G X 1 1/2

## (undated) DEVICE — PAD CAST 4 IN COTTON NON STERILE

## (undated) DEVICE — GLOVE INDICATOR PI UNDERGLOVE SZ 7 BLUE

## (undated) DEVICE — STRETCH BANDAGE: Brand: CURITY

## (undated) DEVICE — OCCLUSIVE GAUZE STRIP,3% BISMUTH TRIBROMOPHENATE IN PETROLATUM BLEND: Brand: XEROFORM

## (undated) DEVICE — CUFF TOURNIQUET 18 X 4 IN QUICK CONNECT DISP 1 BLADDER

## (undated) DEVICE — INTENDED FOR TISSUE SEPARATION, AND OTHER PROCEDURES THAT REQUIRE A SHARP SURGICAL BLADE TO PUNCTURE OR CUT.: Brand: BARD-PARKER ® CARBON RIB-BACK BLADES

## (undated) DEVICE — GLOVE SRG BIOGEL 6.5

## (undated) DEVICE — STERILE BETHLEHEM PLASTIC HAND: Brand: CARDINAL HEALTH

## (undated) DEVICE — SPONGE SCRUB 4 PCT CHLORHEXIDINE

## (undated) DEVICE — SUT CHROMIC 4-0 P-3 18 MM 1654G

## (undated) DEVICE — GLOVE SRG BIOGEL ECLIPSE 7

## (undated) DEVICE — GAUZE SPONGES,16 PLY: Brand: CURITY